# Patient Record
Sex: FEMALE
[De-identification: names, ages, dates, MRNs, and addresses within clinical notes are randomized per-mention and may not be internally consistent; named-entity substitution may affect disease eponyms.]

---

## 2020-02-10 ENCOUNTER — HOSPITAL ENCOUNTER (EMERGENCY)
Dept: HOSPITAL 90 - EDH | Age: 30
Discharge: HOME | End: 2020-02-10
Payer: COMMERCIAL

## 2020-02-10 DIAGNOSIS — R30.0: ICD-10-CM

## 2020-02-10 DIAGNOSIS — R19.7: ICD-10-CM

## 2020-02-10 DIAGNOSIS — R50.9: ICD-10-CM

## 2020-02-10 DIAGNOSIS — Z3A.18: ICD-10-CM

## 2020-02-10 DIAGNOSIS — O21.8: Primary | ICD-10-CM

## 2020-02-10 LAB
ALBUMIN SERPL-MCNC: 2.5 G/DL (ref 3.5–5)
ALT SERPL-CCNC: 20 U/L (ref 12–78)
AST SERPL-CCNC: 17 U/L (ref 10–37)
BASOPHILS NFR BLD AUTO: 0.2 % (ref 0–5)
BILIRUB SERPL-MCNC: 0.2 MG/DL (ref 0.2–1)
BUN SERPL-MCNC: 9 MG/DL (ref 7–18)
CHLORIDE SERPL-SCNC: 104 MMOL/L (ref 101–111)
CO2 SERPL-SCNC: 23 MMOL/L (ref 21–32)
CREAT SERPL-MCNC: 0.4 MG/DL (ref 0.5–1.5)
DEPRECATED SQUAMOUS URNS QL MICRO: (no result) /HPF (ref 0–2)
EOSINOPHIL NFR BLD AUTO: 0.9 % (ref 0–8)
ERYTHROCYTE [DISTWIDTH] IN BLOOD BY AUTOMATED COUNT: 13.7 % (ref 11–15.5)
GFR SERPL CREATININE-BSD FRML MDRD: 198 ML/MIN (ref 60–?)
GLUCOSE SERPL-MCNC: 89 MG/DL (ref 70–105)
HCT VFR BLD AUTO: 35.5 % (ref 36–48)
LYMPHOCYTES NFR SPEC AUTO: 17.5 % (ref 21–51)
MCH RBC QN AUTO: 29.4 PG (ref 27–33)
MCHC RBC AUTO-ENTMCNC: 34.4 G/DL (ref 32–36)
MCV RBC AUTO: 85.5 FL (ref 79–99)
MONOCYTES NFR BLD AUTO: 6.4 % (ref 3–13)
NEUTROPHILS NFR BLD AUTO: 74.7 % (ref 40–77)
NRBC BLD MANUAL-RTO: 0 % (ref 0–0.19)
PH UR STRIP: 6.5 [PH] (ref 5–8)
PLATELET # BLD AUTO: 279 K/UL (ref 130–400)
POTASSIUM SERPL-SCNC: 3.9 MMOL/L (ref 3.5–5.1)
PROT SERPL-MCNC: 6.2 G/DL (ref 6–8.3)
RBC # BLD AUTO: 4.15 MIL/UL (ref 4–5.5)
SODIUM SERPL-SCNC: 136 MMOL/L (ref 136–145)
SP GR UR STRIP: 1.02 (ref 1–1.03)
UROBILINOGEN UR STRIP-MCNC: 1 MG/DL (ref 0.2–1)
WBC # BLD AUTO: 11.6 K/UL (ref 4.8–10.8)
WBC #/AREA URNS HPF: (no result) /HPF (ref 0–1)

## 2020-02-10 PROCEDURE — 76805 OB US >/= 14 WKS SNGL FETUS: CPT

## 2020-02-10 PROCEDURE — 36415 COLL VENOUS BLD VENIPUNCTURE: CPT

## 2020-02-10 PROCEDURE — 81001 URINALYSIS AUTO W/SCOPE: CPT

## 2020-02-10 PROCEDURE — 84702 CHORIONIC GONADOTROPIN TEST: CPT

## 2020-02-10 PROCEDURE — 80053 COMPREHEN METABOLIC PANEL: CPT

## 2020-02-10 PROCEDURE — 85025 COMPLETE CBC W/AUTO DIFF WBC: CPT

## 2020-05-13 ENCOUNTER — HOSPITAL ENCOUNTER (EMERGENCY)
Dept: HOSPITAL 90 - EDH | Age: 30
Discharge: HOME | End: 2020-05-13
Payer: MEDICAID

## 2020-05-13 DIAGNOSIS — Z67.91: ICD-10-CM

## 2020-05-13 DIAGNOSIS — Z3A.30: ICD-10-CM

## 2020-05-13 DIAGNOSIS — O26.893: Primary | ICD-10-CM

## 2020-05-13 PROCEDURE — 99283 EMERGENCY DEPT VISIT LOW MDM: CPT

## 2020-05-13 PROCEDURE — 86901 BLOOD TYPING SEROLOGIC RH(D): CPT

## 2020-05-13 PROCEDURE — 86900 BLOOD TYPING SEROLOGIC ABO: CPT

## 2020-05-13 PROCEDURE — 36415 COLL VENOUS BLD VENIPUNCTURE: CPT

## 2020-05-13 PROCEDURE — 96372 THER/PROPH/DIAG INJ SC/IM: CPT

## 2020-06-22 ENCOUNTER — HOSPITAL ENCOUNTER (INPATIENT)
Dept: HOSPITAL 90 - LDH | Age: 30
LOS: 2 days | Discharge: HOME | DRG: 560 | End: 2020-06-24
Attending: OBSTETRICS & GYNECOLOGY | Admitting: OBSTETRICS & GYNECOLOGY
Payer: MEDICAID

## 2020-06-22 VITALS — BODY MASS INDEX: 43.98 KG/M2 | WEIGHT: 239 LBS | HEIGHT: 62 IN

## 2020-06-22 DIAGNOSIS — E66.9: ICD-10-CM

## 2020-06-22 DIAGNOSIS — Z3A.39: ICD-10-CM

## 2020-06-22 DIAGNOSIS — Z23: ICD-10-CM

## 2020-06-22 DIAGNOSIS — F14.90: ICD-10-CM

## 2020-06-22 LAB
AMPHETAMINES UR QL SCN: NEGATIVE
APPEARANCE UR: (no result)
BARBITURATES UR QL SCN: NEGATIVE
BENZODIAZ UR QL SCN: NEGATIVE
BZE UR QL SCN: POSITIVE
CANNABINOIDS UR QL SCN: NEGATIVE
DEPRECATED SQUAMOUS URNS QL MICRO: (no result) /HPF (ref 0–2)
ERYTHROCYTE [DISTWIDTH] IN BLOOD BY AUTOMATED COUNT: 13.7 % (ref 11–15.5)
HCT VFR BLD AUTO: 35.4 % (ref 36–48)
MCH RBC QN AUTO: 29 PG (ref 27–33)
MCHC RBC AUTO-ENTMCNC: 33.9 G/DL (ref 32–36)
MCV RBC AUTO: 85.5 FL (ref 79–99)
NRBC BLD MANUAL-RTO: 0 % (ref 0–0.19)
OPIATES UR QL SCN: NEGATIVE
PCP UR QL SCN: NEGATIVE
PH UR STRIP: 5.5 [PH] (ref 5–8)
PLATELET # BLD AUTO: 256 K/UL (ref 130–400)
RBC # BLD AUTO: 4.14 MIL/UL (ref 4–5.5)
RBC #/AREA URNS HPF: (no result) /HPF (ref 0–1)
SP GR UR STRIP: 1.04 (ref 1–1.03)
UROBILINOGEN UR STRIP-MCNC: 1 MG/DL (ref 0.2–1)
WBC # BLD AUTO: 8.9 K/UL (ref 4.8–10.8)
WBC #/AREA URNS HPF: (no result) /HPF (ref 0–1)

## 2020-06-22 PROCEDURE — 86901 BLOOD TYPING SEROLOGIC RH(D): CPT

## 2020-06-22 PROCEDURE — 87340 HEPATITIS B SURFACE AG IA: CPT

## 2020-06-22 PROCEDURE — 80305 DRUG TEST PRSMV DIR OPT OBS: CPT

## 2020-06-22 PROCEDURE — 36415 COLL VENOUS BLD VENIPUNCTURE: CPT

## 2020-06-22 PROCEDURE — 81001 URINALYSIS AUTO W/SCOPE: CPT

## 2020-06-22 PROCEDURE — 86900 BLOOD TYPING SEROLOGIC ABO: CPT

## 2020-06-22 PROCEDURE — 86850 RBC ANTIBODY SCREEN: CPT

## 2020-06-22 PROCEDURE — 86592 SYPHILIS TEST NON-TREP QUAL: CPT

## 2020-06-22 PROCEDURE — 88307 TISSUE EXAM BY PATHOLOGIST: CPT

## 2020-06-22 PROCEDURE — 85027 COMPLETE CBC AUTOMATED: CPT

## 2020-06-23 VITALS — SYSTOLIC BLOOD PRESSURE: 111 MMHG | DIASTOLIC BLOOD PRESSURE: 69 MMHG

## 2020-06-23 VITALS — DIASTOLIC BLOOD PRESSURE: 54 MMHG | SYSTOLIC BLOOD PRESSURE: 110 MMHG

## 2020-06-23 VITALS — DIASTOLIC BLOOD PRESSURE: 52 MMHG | SYSTOLIC BLOOD PRESSURE: 113 MMHG

## 2020-06-23 PROCEDURE — 3E0134Z INTRODUCTION OF SERUM, TOXOID AND VACCINE INTO SUBCUTANEOUS TISSUE, PERCUTANEOUS APPROACH: ICD-10-PCS | Performed by: OBSTETRICS & GYNECOLOGY

## 2020-06-23 PROCEDURE — 10907ZC DRAINAGE OF AMNIOTIC FLUID, THERAPEUTIC FROM PRODUCTS OF CONCEPTION, VIA NATURAL OR ARTIFICIAL OPENING: ICD-10-PCS | Performed by: OBSTETRICS & GYNECOLOGY

## 2020-06-23 PROCEDURE — 3E0R3BZ INTRODUCTION OF ANESTHETIC AGENT INTO SPINAL CANAL, PERCUTANEOUS APPROACH: ICD-10-PCS | Performed by: OBSTETRICS & GYNECOLOGY

## 2020-06-23 PROCEDURE — 3E0234Z INTRODUCTION OF SERUM, TOXOID AND VACCINE INTO MUSCLE, PERCUTANEOUS APPROACH: ICD-10-PCS | Performed by: OBSTETRICS & GYNECOLOGY

## 2020-06-23 PROCEDURE — 00HU33Z INSERTION OF INFUSION DEVICE INTO SPINAL CANAL, PERCUTANEOUS APPROACH: ICD-10-PCS | Performed by: OBSTETRICS & GYNECOLOGY

## 2020-06-23 RX ADMIN — DOCUSATE SODIUM SCH MG: 100 TABLET, FILM COATED ORAL at 21:03

## 2020-06-23 RX ADMIN — IBUPROFEN PRN MG: 600 TABLET ORAL at 18:47

## 2020-06-23 NOTE — NUR
+cocaine at deliver/ CPS report # [id number]



Mayda met with pt and  Filippo Ulloa [phone]. This is first child for couple, 
son Filippo Ulloa. Pt has 2 other children son 5 and daughter 3 who were removed from 
pt by CPS and pt states they are in [place] with family. Pt works as provider, has Medicaid 
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  states they have "everything" 
for baby including car seat and Dr Vera will follow [place]. Mom and  are 
aware that she tested positive for cocaine at delivery and baby will be tested as well. 
Mother states she last used "on the 20th I think.. maybe on the 18th". Pt stated she was not 
a regular user, "I take iron, could that had made me positive for cocaine"? Pt denies 
regular abuse during pregnancy.

Pt denies any hx with abuse, domestic violence,legal or mental health issues. "I do have 
anxiety". Pt states she was never dx by PCP or psychiatrist, states she self dx.

Informed parents that baby will be staying for monitoring related to drug exposure. parents 
state they have means to come see baby till [place].

MAYDA spoke to Jazmyn Hernandez at CPS. Pt has no open case.

Mayda made report to CPS hotline #19450020 to Sally penaloza [phone]. Waiting on CPS to see baby and 
provide safety plan for baby.

-------------------------------------------------------------------------------

Addendum: 06/23/20 at 1720 by MICHELLE DAWN SS

-------------------------------------------------------------------------------

Amended: Links added.

## 2020-06-23 NOTE — NUR
assisted to the bathroon, voided 900 ml, pericare done, assisted back to bed.

RECOVERY QBL - 31 ML

-------------------------------------------------------------------------------

Addendum: 06/23/20 at 1537 by LINDY MACIAS RN

-------------------------------------------------------------------------------

Amended: Links added.

## 2020-06-24 VITALS — SYSTOLIC BLOOD PRESSURE: 110 MMHG | DIASTOLIC BLOOD PRESSURE: 68 MMHG

## 2020-06-24 VITALS — DIASTOLIC BLOOD PRESSURE: 55 MMHG | SYSTOLIC BLOOD PRESSURE: 90 MMHG

## 2020-06-24 VITALS — SYSTOLIC BLOOD PRESSURE: 107 MMHG | DIASTOLIC BLOOD PRESSURE: 52 MMHG

## 2020-06-24 RX ADMIN — DOCUSATE SODIUM SCH MG: 100 TABLET, FILM COATED ORAL at 09:26

## 2020-06-24 RX ADMIN — IBUPROFEN PRN MG: 600 TABLET ORAL at 09:26

## 2020-06-24 NOTE — NUR
DISCHARGE

PT LEFT UNIT VIA WHEELCHAIR, ACCOMPANIED BY FAMILY FRIEND. BABY TO STAY IN NURSERY FOR 
CONTINUITY OF CARE. DENIED PAIN AND HAD NO COMPLAINTS. TRANSPORTED BY PERSONAL VEHICLE.

## 2020-06-24 NOTE — NUR
CPS f/u



Sw recd message from Margarita Macdonald, CPS casewker [phone]. She works 3pm to 12pm.  SW left 
message that pt will dc today. Baby to remain for observation.

## 2020-06-24 NOTE — NUR
CPS f/u



Sw recd message from Margarita Macdonald, CPS casewker [phone]. She works 3pm to 12pm.  SW left 
message that pt will dc today. Baby to remain for observation.

## 2023-09-11 ENCOUNTER — HOSPITAL ENCOUNTER (EMERGENCY)
Dept: HOSPITAL 90 - EDH | Age: 33
Discharge: LEFT BEFORE DISCHARGE | End: 2023-09-11
Payer: MEDICAID

## 2023-09-11 VITALS
SYSTOLIC BLOOD PRESSURE: 114 MMHG | OXYGEN SATURATION: 99 % | DIASTOLIC BLOOD PRESSURE: 68 MMHG | HEART RATE: 90 BPM | RESPIRATION RATE: 16 BRPM

## 2023-09-11 VITALS — WEIGHT: 203.93 LBS | HEIGHT: 62 IN | BODY MASS INDEX: 37.53 KG/M2

## 2023-09-11 DIAGNOSIS — K08.89: Primary | ICD-10-CM

## 2023-09-11 DIAGNOSIS — Z53.21: ICD-10-CM

## 2025-05-16 ENCOUNTER — HOSPITAL ENCOUNTER (EMERGENCY)
Dept: HOSPITAL 90 - EDH | Age: 35
Discharge: HOME | End: 2025-05-16
Payer: SELF-PAY

## 2025-05-16 VITALS
RESPIRATION RATE: 16 BRPM | OXYGEN SATURATION: 98 % | SYSTOLIC BLOOD PRESSURE: 112 MMHG | TEMPERATURE: 97.7 F | DIASTOLIC BLOOD PRESSURE: 59 MMHG | HEART RATE: 80 BPM

## 2025-05-16 DIAGNOSIS — F19.10: ICD-10-CM

## 2025-05-16 DIAGNOSIS — N39.0: Primary | ICD-10-CM

## 2025-05-16 LAB
AMPHETAMINES UR QL SCN: POSITIVE
APPEARANCE UR: (no result)
BACTERIA URNS QL MICRO: (no result) /HPF
BARBITURATES UR QL SCN: NEGATIVE
BASOPHILS # BLD AUTO: 0.08 K/UL (ref 0–0.2)
BENZODIAZ UR QL SCN: NEGATIVE
BILIRUB UR QL STRIP: NEGATIVE MG/DL
BZE UR QL SCN: NEGATIVE
CANNABINOIDS UR QL SCN: NEGATIVE
COLOR UR: YELLOW
CREAT SERPL-MCNC: 0.6 MG/DL (ref 0.5–1)
DEPRECATED SQUAMOUS URNS QL MICRO: (no result) /HPF (ref 0–2)
EOSINOPHIL # BLD AUTO: 0.48 K/UL (ref 0–0.7)
EOSINOPHIL NFR BLD AUTO: 5.9 % (ref 0–8)
GLUCOSE UR STRIP-MCNC: NEGATIVE MG/DL
HCG UR QL: NEGATIVE
HCT VFR BLD AUTO: 42.9 % (ref 36–48)
HGB UR QL STRIP: (no result)
IMM GRANULOCYTES # BLD: 0.03 K/UL (ref 0–1)
KETONES UR STRIP-MCNC: NEGATIVE MG/DL
LEUKOCYTE ESTERASE UR QL STRIP: 250 LEU/UL
LYMPHOCYTES # SPEC AUTO: 2.8 K/UL (ref 1–4.8)
LYMPHOCYTES NFR SPEC AUTO: 34.2 % (ref 21–51)
MCH RBC QN AUTO: 29.3 PG (ref 27–33)
MCHC RBC AUTO-ENTMCNC: 33.3 G/DL (ref 32–36)
MCV RBC AUTO: 87.9 FL (ref 79–99)
MONOCYTES # BLD AUTO: 0.9 K/UL (ref 0.1–1)
MUCOUS THREADS URNS QL MICRO: (no result) LPF
NEUTROPHILS # BLD AUTO: 3.9 K/UL (ref 1.8–7.7)
NEUTROPHILS NFR BLD AUTO: 47.5 % (ref 40–77)
NITRITE UR QL STRIP: NEGATIVE
OPIATES UR QL SCN: NEGATIVE
PCP UR QL SCN: NEGATIVE
PLATELET # BLD AUTO: 353 K/UL (ref 130–400)
POTASSIUM SERPL-SCNC: 3.6 MMOL/L (ref 3.5–5.1)
PROT UR QL STRIP: 30 MG/DL
RBC # BLD AUTO: 4.88 MIL/UL (ref 4–5.5)
SP GR UR STRIP: 1.04 (ref 1–1.03)
WBC # BLD AUTO: 8.1 K/UL (ref 4.8–10.8)

## 2025-05-16 PROCEDURE — 81025 URINE PREGNANCY TEST: CPT

## 2025-05-16 PROCEDURE — 85025 COMPLETE CBC W/AUTO DIFF WBC: CPT

## 2025-05-16 PROCEDURE — 81001 URINALYSIS AUTO W/SCOPE: CPT

## 2025-05-16 PROCEDURE — 80048 BASIC METABOLIC PNL TOTAL CA: CPT

## 2025-05-16 PROCEDURE — 80305 DRUG TEST PRSMV DIR OPT OBS: CPT

## 2025-05-16 PROCEDURE — 87086 URINE CULTURE/COLONY COUNT: CPT

## 2025-05-16 PROCEDURE — 96365 THER/PROPH/DIAG IV INF INIT: CPT

## 2025-05-16 PROCEDURE — 99284 EMERGENCY DEPT VISIT MOD MDM: CPT

## 2025-05-16 PROCEDURE — 36415 COLL VENOUS BLD VENIPUNCTURE: CPT

## 2025-05-16 RX ADMIN — SODIUM CHLORIDE SCH MLS/HR: 9 INJECTION, SOLUTION INTRAVENOUS at 15:17
